# Patient Record
(demographics unavailable — no encounter records)

---

## 2024-10-17 NOTE — HISTORY OF PRESENT ILLNESS
[FreeTextEntry1] : RAJESH KOENIG is a 80 year old woman with GCA clinically with elevated ESR but negative bx. Recurrence of scalp tenderness, overall skin hyperesthesia over head/neck, mild temporal HA, mild jaw claudication, sweating with taper to 30mg, did well with 40 for a time, then requiring up titration to 60mg at which point she again achieved complete resolution of GCA symptoms. Started on Actemra SQ as steroid-sparing agent 2/2 requirements for high dose steroids and multiple comorbidities. Tolerating well, no issues with injection, no SE. Since last telephone conversation decreased steroids to 40mg daily without issues. Improvement in steroid induced muscle weakness but remains with thigh weakness. Is exercising regularly to improve this. Reports HA intermittently, clicking in jaw but no jaw claudication.   Denies new/worsening HA, visual changes, scalp tenderness, jaw claudication, F/C, night sweats, unintentional weight loss, limb weakness or paresthesias at present. No shoulder/hip girdle sx.  No F/C, recent infections. Continued mild SOB but no cough and can do all ADLs. Due to see cardiology next week as worsening LE edema as well.   GCA hx - Developed acute onset bilateral vision changes in December -- opaque bubbles in the vision + scalp sensitivity and itching, b/l shoulder and hip achy pain, low grade fever, some jaw pain with chewing so she was favoring softer foods-- self resolved within 30 minutes, ESR 84, started on prednisone 60 mg on 12/19/19, underwent bilateral TAB which were negative, tapered to prednisone 40 mg since 12/27. Then increased again. Actemra started Feb 2020.   + L hip OA with groin pain, stable  Labs: ESR 84 --> 50 -->36 --> 3, JORGE 1:320 homo (2017), CPK normal  B/l TAB 12/2019 -- negative  MRI/MRA head/neck normal  -------------  7/31/20 -- Since last visit, continued mild HA, continued scalp itchiness on prednisone 12.5mg so increased to 15mg. Today with mild, transient HA and milder scalp itchiness but no pain. Remains without visual changes or jaw claudication but at baseline has some TMJ which limits chewing. No SE with increased steroid dose, remains on Actemra qweekly.   9/25/20 -- Stable, continued mild HA and scalp itch since last visit, not worsening. Tapered x 1 week to prednisone 10mg without flare of sx. Denies new/worsening HA, visual changes, jaw claudication, F/C, night sweats, unintentional weight loss, limb weakness or paresthesias. No SE with Actemra. Feels well otherwise.   10/30/20 -- Intermittent HA but mild, b/l TMJ pain 2/2 grinding teeth. No recurrence of sx as tapering, denies severe consistent HA, visual changes, jaw claudication, temporal pain, F/C, PMR sx. Strength improving as steroids taper off. No infectious sx, feels well otherwise.   12/29/20 -- Ongoing mild HA, TMJ but no overt GCA sx, visual changes, F/C, PMR sx. Prednisone 5mg daily currently. Following with new ENT for throat lesion, felt to not be malignant so will be surveilled. Worsening depression but no SI/HI, following with psych.   4/1/21 -- GCA and PMR sx quiescent, has been trying not to grind teeth and this is helping with TMJ pain. Recent fall and has been unstable and now using cane constantly, no further falls, and has started PT to work on balance. Upcoming cardiac cath with plan for TAVR if needed thereafter. Tolerated taper of steroids well.   6/1/21 -- No GCA or PMR sx, prior TMJ also resolved. Marked improvement in SOB/NORTH, fatigue, and LE edema s/p TAVR, has healed up nicely, has resumed Actemra and tapered off steroids as of last week with mild arthralgias for a few days after only. Working with PT to improve strength and gait.   11/19/21 -- Presents acutely for b/l deQuervains tenosynovitis over b/l wrists, R >> L, in setting of recent PT work with repetitive wrist use. No other inflammatory arthritis, PMR and GCA ROS negative, and has been taking Actemra consistently. PT is helping her.   12/9/21 -- Ongoing but approx 60% improved b/l deQuervains s/p steroid taper, R is lagging behind, resolving bruise over L tho pt does not recall any trauma. No GCA or PMR sx, no other joint complaints, has not resumed PT yet as she is worried she will aggravate it. Also reports she normally gets myalgias from the >65 flu dose but had none with the regular one last visit.   2/17/22 -- Worsening diffuse muscle cramping in hands and feet, legs and occasionally back. GCA/PMR ROS negative, no issues with Actemra, no infectious sx. Prior Dequervains has entirely resolved, never needed local injections, off steroids. Worsening toe Raynaud's over last few months, no ulcerations.   5/11/22 -- B/l pinky triggers since last visit, not limiting her. GCA/PMR ROS negative, no issues with Actemra, no infectious sx. Raynauds improved with CCB, no SE. Mood improved since resuming antidepressants.   11/10/22 -- Ongoing pinky triggers not limiting her, GCA/PMR ROS negative, no issues with Actemra. + recent tooth issue c/b sinus infection, on Abx and will be getting extraction with ?plan for implant tomorrow. Raynaud's stable.   3/8/23 -- GCA/PMR ROS negative, no issues with Actemra, prior triggering has not recurred, no peripheral joint inflammation, Raynaud's stable. Feels well.   9/5/23 -- PMR/GCA ROS negative, no issues with Actemra, asking if can scale back on use, no peripheral joint sx, Raynauds stable.   12/12/23 -- Worsening symmetrical small joint arthralgias, no synovitis but limited ROM in hands, spasm ongoing in legs and ribs. PMR distribution not affected but some IT band TTP, GCA ROS negative but some night sweats. Raynauds stable.   4/18/24 -- Felt worse when tried to taper back Actemra, back on qweekly and no PMR/GCA sx. Pain over lateral R thigh, pt doing all her injections in this area. Raynauds stable. Hasn't done DEXA, no falls but needs cane at all times, trying to stay active. Seeing neurology for ongoing rib spasms as conservative measures haven't helped and doesn't want to try muscle relaxers.   10/17/24 -- Doing well, less arthralgias since losing some weight, ADLs intact.

## 2024-10-17 NOTE — ASSESSMENT
[FreeTextEntry1] : RAJESH KOENIG is a 83 year old woman with GCA dx clinically - abrupt visual disturbance, ESR 84, scalp TTP, low grade fevers, jaw claudication -- improved with high dose steroids, recurred on taper, resolved again on prednisone 60mg daily. TAB negative but given above symptoms, she has a moderate degree of suspicion for GCA irrespective of the biopsy. Given ongoing need for elevated doses of prednisone in setting of + DM2 and HTN, and concern for long term steroid SE, has been started on Actemra as steroid sparing immunomodulator. Doing well at present, ESR normalized and without overt GCA or PMR sx.  # GCA/PMR - currently quiescent - c/w Actemra q. weekly. Can revisit q2 weeks dosing if patient would like in the future  - try to alternate thighs for injections - might help R localized thigh pain  - no current role for steroids  - Dec labs stable, repeat with PMD labs in June including immunosuppression labs: Hepatitis panel, Quantiferon  - GCA symptoms reviewed with patient, advised to call office if develops any, go to ER if any visual changes  # Chronic diffuse arthralgias --appears moreso MSK, spasms of unclear etiology but in odd locations such as trunk  - CPK/aldolase normal, hasn't responded to conservative measures - supplemental Mg, dietary K, adequate PO hydration -- and defers muscle relaxers today - agree with neuro eval  - c/w daily light stretching   # Immunosuppressed status - pt counseled to call if febrile or unwell, counseled to hold medication while on Abx  # OP screening - has not had DEXA in >4 years - DEXA reordered today  - c/w optimized Ca/Vit D to maintain bone health 2/2 prior steroid use - c/w Ca 600mg BID with food, Vit D 1000 IU daily - Weightbearing exercises as tolerated for goal of 30 minutes 3x/week  # Raynauds - c/w non-pharmacological measures for Raynaud's - gloves, pocket warmers, limiting cold exposure - c/w low dose CCB  RTC in 4 months, sooner if new/worsening sx.

## 2024-10-17 NOTE — REVIEW OF SYSTEMS
[Arthralgias] : arthralgias [As Noted in HPI] : as noted in HPI [Difficulty Walking] : difficulty walking [Negative] : Heme/Lymph [Joint Swelling] : no joint swelling [Joint Stiffness] : no joint stiffness [de-identified] : dry skin, Raynauds  [de-identified] : Spasm

## 2024-10-17 NOTE — PHYSICAL EXAM
[General Appearance - Alert] : alert [General Appearance - In No Acute Distress] : in no acute distress [Sclera] : the sclera and conjunctiva were normal [PERRL With Normal Accommodation] : pupils were equal in size, round, and reactive to light [Extraocular Movements] : extraocular movements were intact [Oropharynx] : the oropharynx was normal [Neck Appearance] : the appearance of the neck was normal [Auscultation Breath Sounds / Voice Sounds] : lungs were clear to auscultation bilaterally [Heart Rate And Rhythm] : heart rate was normal and rhythm regular [Heart Sounds] : normal S1 and S2 [No CVA Tenderness] : no ~M costovertebral angle tenderness [No Spinal Tenderness] : no spinal tenderness [Nail Clubbing] : no clubbing  or cyanosis of the fingernails [Musculoskeletal - Swelling] : no joint swelling seen [Motor Tone] : muscle strength and tone were normal [] : no rash [No Focal Deficits] : no focal deficits [Impaired Insight] : insight and judgment were intact [Oriented To Time, Place, And Person] : oriented to person, place, and time [Affect] : the affect was normal [FreeTextEntry1] : dry skin over hands and feet and scalp, no active raynauds

## 2024-11-18 NOTE — REVIEW OF SYSTEMS
[Incontinence] : incontinence [Joint Pain] : joint pain [Joint Stiffness] : joint stiffness [Muscle Weakness] : muscle weakness [Muscle Pain] : muscle pain [Negative] : Heme/Lymph [Dysuria] : no dysuria [Nocturia] : no nocturia [Hematuria] : no hematuria [Frequency] : no frequency [Vaginal Discharge] : no vaginal discharge

## 2024-11-18 NOTE — ASSESSMENT
[FreeTextEntry1] :  Approximately 45 minutes was involved in this patient's care, including but not limited to preparing to see the patient, reviewing and obtaining the past and interval history, including medications, reviewing relevant testing, documenting clinical information, ordering of appropriate medications and testing, and coordinating medical care, including communicating with professionals involved in the care of the patient. flu vaccine provided today new follow up labs ordered.

## 2024-11-18 NOTE — PHYSICAL EXAM
[No Acute Distress] : no acute distress [Well Nourished] : well nourished [EOMI] : extraocular movements intact [Supple] : supple [Clear to Auscultation] : lungs were clear to auscultation bilaterally [Normal S1, S2] : normal S1 and S2 [No Edema] : there was no peripheral edema [Soft] : abdomen soft [Non Tender] : non-tender [No Rash] : no rash [Normal Gait] : normal gait [Normal Affect] : the affect was normal [de-identified] : right shoulder pain, pain with rom, lateral abduction, and posterior internal rotation

## 2024-11-18 NOTE — HISTORY OF PRESENT ILLNESS
[FreeTextEntry1] : follow up for recent UTI, recent ER visit, left inguinal hernia [de-identified] : Ms Blank Tavares is a 84 yo female presents today routine follow up. Pt was recently treated for UTI, states faired very poorly with ciprofloxacin, all joints pain. Pt now completed the antibiotics and seeks new labs checked, especially Urinalysis/UC. Pt also follows up with rheumatology Dr. Escudero, hx of Temporal arteritis. Other pertinent hx include urinary incontinence stress and urgency, type 2 diabetes, HTN, hypothyroidism. Pt also interested in flu vaccine today. Pt today denies any fever, chills, n/v/c/d. Pt also denies any LUTS.

## 2025-01-07 NOTE — HISTORY OF PRESENT ILLNESS
[FreeTextEntry1] : RAJESH KOENIG is a 80 year old woman with GCA clinically with elevated ESR but negative bx. Recurrence of scalp tenderness, overall skin hyperesthesia over head/neck, mild temporal HA, mild jaw claudication, sweating with taper to 30mg, did well with 40 for a time, then requiring up titration to 60mg at which point she again achieved complete resolution of GCA symptoms. Started on Actemra SQ as steroid-sparing agent 2/2 requirements for high dose steroids and multiple comorbidities. Tolerating well, no issues with injection, no SE. Since last telephone conversation decreased steroids to 40mg daily without issues. Improvement in steroid induced muscle weakness but remains with thigh weakness. Is exercising regularly to improve this. Reports HA intermittently, clicking in jaw but no jaw claudication.   Denies new/worsening HA, visual changes, scalp tenderness, jaw claudication, F/C, night sweats, unintentional weight loss, limb weakness or paresthesias at present. No shoulder/hip girdle sx.  No F/C, recent infections. Continued mild SOB but no cough and can do all ADLs. Due to see cardiology next week as worsening LE edema as well.   GCA hx - Developed acute onset bilateral vision changes in December -- opaque bubbles in the vision + scalp sensitivity and itching, b/l shoulder and hip achy pain, low grade fever, some jaw pain with chewing so she was favoring softer foods-- self resolved within 30 minutes, ESR 84, started on prednisone 60 mg on 12/19/19, underwent bilateral TAB which were negative, tapered to prednisone 40 mg since 12/27. Then increased again. Actemra started Feb 2020.   + L hip OA with groin pain, stable  Labs: ESR 84 --> 50 -->36 --> 3, JORGE 1:320 homo (2017), CPK normal  B/l TAB 12/2019 -- negative  MRI/MRA head/neck normal  -------------  7/31/20 -- Since last visit, continued mild HA, continued scalp itchiness on prednisone 12.5mg so increased to 15mg. Today with mild, transient HA and milder scalp itchiness but no pain. Remains without visual changes or jaw claudication but at baseline has some TMJ which limits chewing. No SE with increased steroid dose, remains on Actemra qweekly.   9/25/20 -- Stable, continued mild HA and scalp itch since last visit, not worsening. Tapered x 1 week to prednisone 10mg without flare of sx. Denies new/worsening HA, visual changes, jaw claudication, F/C, night sweats, unintentional weight loss, limb weakness or paresthesias. No SE with Actemra. Feels well otherwise.   10/30/20 -- Intermittent HA but mild, b/l TMJ pain 2/2 grinding teeth. No recurrence of sx as tapering, denies severe consistent HA, visual changes, jaw claudication, temporal pain, F/C, PMR sx. Strength improving as steroids taper off. No infectious sx, feels well otherwise.   12/29/20 -- Ongoing mild HA, TMJ but no overt GCA sx, visual changes, F/C, PMR sx. Prednisone 5mg daily currently. Following with new ENT for throat lesion, felt to not be malignant so will be surveilled. Worsening depression but no SI/HI, following with psych.   4/1/21 -- GCA and PMR sx quiescent, has been trying not to grind teeth and this is helping with TMJ pain. Recent fall and has been unstable and now using cane constantly, no further falls, and has started PT to work on balance. Upcoming cardiac cath with plan for TAVR if needed thereafter. Tolerated taper of steroids well.   6/1/21 -- No GCA or PMR sx, prior TMJ also resolved. Marked improvement in SOB/NORTH, fatigue, and LE edema s/p TAVR, has healed up nicely, has resumed Actemra and tapered off steroids as of last week with mild arthralgias for a few days after only. Working with PT to improve strength and gait.   11/19/21 -- Presents acutely for b/l deQuervains tenosynovitis over b/l wrists, R >> L, in setting of recent PT work with repetitive wrist use. No other inflammatory arthritis, PMR and GCA ROS negative, and has been taking Actemra consistently. PT is helping her.   12/9/21 -- Ongoing but approx 60% improved b/l deQuervains s/p steroid taper, R is lagging behind, resolving bruise over L tho pt does not recall any trauma. No GCA or PMR sx, no other joint complaints, has not resumed PT yet as she is worried she will aggravate it. Also reports she normally gets myalgias from the >65 flu dose but had none with the regular one last visit.   2/17/22 -- Worsening diffuse muscle cramping in hands and feet, legs and occasionally back. GCA/PMR ROS negative, no issues with Actemra, no infectious sx. Prior Dequervains has entirely resolved, never needed local injections, off steroids. Worsening toe Raynaud's over last few months, no ulcerations.   5/11/22 -- B/l pinky triggers since last visit, not limiting her. GCA/PMR ROS negative, no issues with Actemra, no infectious sx. Raynauds improved with CCB, no SE. Mood improved since resuming antidepressants.   11/10/22 -- Ongoing pinky triggers not limiting her, GCA/PMR ROS negative, no issues with Actemra. + recent tooth issue c/b sinus infection, on Abx and will be getting extraction with ?plan for implant tomorrow. Raynaud's stable.   3/8/23 -- GCA/PMR ROS negative, no issues with Actemra, prior triggering has not recurred, no peripheral joint inflammation, Raynaud's stable. Feels well.   9/5/23 -- PMR/GCA ROS negative, no issues with Actemra, asking if can scale back on use, no peripheral joint sx, Raynauds stable.   12/12/23 -- Worsening symmetrical small joint arthralgias, no synovitis but limited ROM in hands, spasm ongoing in legs and ribs. PMR distribution not affected but some IT band TTP, GCA ROS negative but some night sweats. Raynauds stable.   4/18/24 -- Felt worse when tried to taper back Actemra, back on qweekly and no PMR/GCA sx. Pain over lateral R thigh, pt doing all her injections in this area. Raynauds stable. Hasn't done DEXA, no falls but needs cane at all times, trying to stay active. Seeing neurology for ongoing rib spasms as conservative measures haven't helped and doesn't want to try muscle relaxers.   10/17/24 -- Doing well, less arthralgias since losing some weight, ADLs intact, no falls. GCA/PMR ROS negative, Raynaud's stable.   1/7/25: Right posterior hip pain has been going on for about 3 weeks. Traveling down from right hip to knee and groin. Still taking tylenol, helps a little. Achy type of pain. In the morning have a hard time moving around. With the weather, she felt her knees are sore. No recent trauma. Gets couple hours of sleep and sometimes she feels pain wakes her up.

## 2025-01-07 NOTE — PHYSICAL EXAM
[General Appearance - Alert] : alert [General Appearance - In No Acute Distress] : in no acute distress [Musculoskeletal - Swelling] : no joint swelling seen [FreeTextEntry1] : positive right sided straight leg test, good range of motion with right hip external rotation, some limitation with right hip internal rotaiton, full range of motion with right hip flexion and extension.  [] : no rash

## 2025-01-07 NOTE — ASSESSMENT
[FreeTextEntry1] : RAJESH KOENIG is a 83 year old woman with GCA dx clinically - abrupt visual disturbance, ESR 84, scalp TTP, low grade fevers, jaw claudication -- improved with high dose steroids, recurred on taper, resolved again on prednisone 60mg daily. TAB negative but given above symptoms, she has a moderate degree of suspicion for GCA irrespective of the biopsy. Given ongoing need for elevated doses of prednisone in setting of + DM2 and HTN, and concern for long term steroid SE, has been started on Actemra as steroid sparing immunomodulator. Doing well at present, ESR normalized and without overt GCA or PMR sx.  Presents today on 1/7 with right sided back/hip pain, exam consistent with sciatica. Suspect could be from DJD of lumbar spine and could have some OA of hips. I have asked her to get an x-ray of lumbosacral spine and hip/pelvis. We went over tylenol regimen as she was taking 3500mg daily, to decrease to 3000mg daily and at next visit with Dr. Escudero if symptoms persist can consider trialing low dose gabapentin (however pt says she would rather not try anything new at this time).   # GCA/PMR - currently quiescent - c/w Actemra q. weekly. Can revisit q2 weeks dosing if patient would like in the future  - no current role for steroids  - May labs stable, repeat with next PMD labs including immunosuppression labs: Hepatitis panel, Quantiferon  - GCA symptoms reviewed with patient, advised to call office if develops any, go to ER if any visual changes  # Chronic diffuse arthralgias --appears moreso MSK, spasms of unclear etiology but in odd locations such as trunk  - CPK/aldolase normal, hasn't responded to conservative measures - supplemental Mg, dietary K, adequate PO hydration -- has defered muscle relaxers -  c/w daily light stretching  # Immunosuppressed status - pt counseled to call if febrile or unwell, counseled to hold medication while on Abx - advised annual flu vaccine in Nov   # OP screening - has not had DEXA in >4 years - DEXA reordered today  - c/w optimized Ca/Vit D to maintain bone health 2/2 prior steroid use - c/w Ca 600mg BID with food, Vit D 1000 IU daily - Weightbearing exercises as tolerated for goal of 30 minutes 3x/week  # Raynauds - c/w non-pharmacological measures for Raynaud's - gloves, pocket warmers, limiting cold exposure - c/w low dose CCB  RTC in February 2025 with Dr. Escudero

## 2025-01-07 NOTE — REVIEW OF SYSTEMS
[Arthralgias] : arthralgias [As Noted in HPI] : as noted in HPI [Difficulty Walking] : difficulty walking [Negative] : Heme/Lymph [Joint Swelling] : no joint swelling [Joint Stiffness] : no joint stiffness [de-identified] : dry skin, Raynauds  [de-identified] : Spasm

## 2025-01-17 NOTE — HISTORY OF PRESENT ILLNESS
[FreeTextEntry1] : 84 y/o F, Hx Giant Cell Arteritis, Polymyalgia Rheumatica, HTN, Hypothyroidism,  A fib s/p ablation.Obesity Referred or Rt hip and low back pain   Reports gradual onset Rt hip and b/l low back pain x 2 months, No preceeding trauma or illness Pain is dull, on right hip radiating to medial upper thigh and over TFT/ITB, worse with movement, forward spinal flexion and prolonged standing No bowel or blader dysfunction  No benefit with tylenol and no recent engagement in therapy She did get therapy in the past at [facility], ambulatory PT, with minimal benefit But had remained reasonably pain free up till this time  She has been commenced on increasing dose gabapentin by her Rheumatologist with some benefit Currently on 200mg qhs and will be increasing to stable dose of 300mg qhs next week No overt drowsiness   Social Non smoker, no alcohol use Living alone, independent with ADLs Using walker and WC Able to cook and provider her basic needs But recently has to take multiple rest breaks when performing her routine daily activities due to hip/back pain

## 2025-01-17 NOTE — HISTORY OF PRESENT ILLNESS
[FreeTextEntry1] : 82 y/o F, Hx Giant Cell Arteritis, Polymyalgia Rheumatica, HTN, Hypothyroidism,  A fib s/p ablation.Obesity Referred or Rt hip and low back pain   Reports gradual onset Rt hip and b/l low back pain x 2 months, No preceeding trauma or illness Pain is dull, on right hip radiating to medial upper thigh and over TFT/ITB, worse with movement, forward spinal flexion and prolonged standing No bowel or blader dysfunction  No benefit with tylenol and no recent engagement in therapy She did get therapy in the past at [facility], ambulatory PT, with minimal benefit But had remained reasonably pain free up till this time  She has been commenced on increasing dose gabapentin by her Rheumatologist with some benefit Currently on 200mg qhs and will be increasing to stable dose of 300mg qhs next week No overt drowsiness   Social Non smoker, no alcohol use Living alone, independent with ADLs Using walker and WC Able to cook and provider her basic needs But recently has to take multiple rest breaks when performing her routine daily activities due to hip/back pain

## 2025-01-17 NOTE — DATA REVIEWED
[FreeTextEntry1] : 4/30/24--MRI thoracic spine--arthritis  1/13/25--DEXA scan--osteopenia 1/13/25--Multilevel osteoarthritis lumbar spine with hip OA R> left   Cr 1.24

## 2025-01-17 NOTE — REVIEW OF SYSTEMS
[Joint Stiffness] : joint stiffness [Muscle Pain] : muscle pain [Muscle Weakness] : no muscle weakness [Difficulty Walking] : difficulty walking

## 2025-01-17 NOTE — ASSESSMENT
[FreeTextEntry1] : 84 y/o F seen after referral for Rt hip and low back pain  Exam indicative of hips muscle tightness, limited ROM Imaging showering hip OA and multilevel lumbar disc disease  Recent labs--not acute changes,  Referred to physical therapy Ordered lidocaine patch and voltaren gell Continue tylenol extra 2gm bid and additional 500mg od prn Avoid NSAIDs, adverse effects discussed, considering her already existing CKD F/u 8 wks

## 2025-01-17 NOTE — PHYSICAL EXAM
[FreeTextEntry1] : Alert and fully oriented  Tone normal  Hip--Pain with Rt hip extension and limited extension  Pain with terminal flexion  Left knee --limited ROM TTP over b/l ITB  Requiring min assist sit to stand Took few steps without device, requiring rest after < 5ft due to fatigue and back hip pain

## 2025-01-31 NOTE — HISTORY OF PRESENT ILLNESS
[de-identified] : 83 year old female presents for tonsil cyst Has known about it for 3yr Previously seen Dr. Lai and Alessandro who recommended against intervention Denies dysphagia, no voice changes No other related sx No related pain

## 2025-01-31 NOTE — HISTORY OF PRESENT ILLNESS
[de-identified] : 83 year old female presents for tonsil cyst Has known about it for 3yr Previously seen Dr. Lai and Alessandro who recommended against intervention Denies dysphagia, no voice changes No other related sx No related pain

## 2025-01-31 NOTE — PHYSICAL EXAM
[Nasal Endoscopy Performed] : nasal endoscopy was performed, see procedure section for findings [de-identified] : R tonsillar cyst [Normal] : no rashes

## 2025-01-31 NOTE — PHYSICAL EXAM
[Nasal Endoscopy Performed] : nasal endoscopy was performed, see procedure section for findings [de-identified] : R tonsillar cyst [Normal] : no rashes

## 2025-03-10 NOTE — DISCUSSION/SUMMARY
[Patient] : the patient [Risks] : risks [Benefits] : benefits [Alternatives] : alternatives [___ Month(s)] : in [unfilled] month(s) [FreeTextEntry1] : Discussed with patient.  Continue losartan metoprolol aspirin hydrochlorothiazide and statin from cardiac viewpoint..  SBE prophylaxis discussed.  Follow-up echo regarding TAVR 6 months.  Hydrochlorothiazide to 12.5 mg daily.  Questions addressed with her. Labs with PMD [EKG obtained to assist in diagnosis and management of assessed problem(s)] : EKG obtained to assist in diagnosis and management of assessed problem(s)

## 2025-03-10 NOTE — CARDIOLOGY SUMMARY
[de-identified] : 11/7/22 sinus nstwc March 6, 2023 sinus rhythm nonspecific ST-T change September 11, 2023 sinus rhythm July 18, 2024 sinus rhythm low voltage March 11, 2024 sinus rhythm IR BBB 9/9/24 sinus March 10, 2025 sinus rhythm  [de-identified] : July 15, 2021 hyperdynamic left ventricle mild diastolic dysfunction TAVR no regurgitation November 2022 bioprosthetic valve normal LV systolic function November 2023 normal LV function TAVR 9/9/24 normal lv TAVR  [de-identified] : Ablation 2012 [de-identified] : 1998, 2021 [de-identified] : TAVR 2021 bioprosthesis

## 2025-03-10 NOTE — REASON FOR VISIT
[Arrhythmia/ECG Abnorrmalities] : arrhythmia/ECG abnormalities [Structural Heart and Valve Disease] : structural heart and valve disease [FreeTextEntry3] : Geri

## 2025-03-10 NOTE — ASSESSMENT
[FreeTextEntry1] : Status post TAVR.  Doing well.    No known recurrence of SVT .  Low normal blood pressure.  Some fatigue could be related

## 2025-03-10 NOTE — PHYSICAL EXAM
[Well Developed] : well developed [Well Nourished] : well nourished [No Acute Distress] : no acute distress [Normal Conjunctiva] : normal conjunctiva [Normal Venous Pressure] : normal venous pressure [No Carotid Bruit] : no carotid bruit [Normal S1, S2] : normal S1, S2 [No Rub] : no rub [No Gallop] : no gallop [Clear Lung Fields] : clear lung fields [Good Air Entry] : good air entry [No Respiratory Distress] : no respiratory distress  [Normal Gait] : normal gait [No Edema] : no edema [No Cyanosis] : no cyanosis [No Clubbing] : no clubbing [No Rash] : no rash [Moves all extremities] : moves all extremities [No Focal Deficits] : no focal deficits [Normal Speech] : normal speech [Alert and Oriented] : alert and oriented [Normal memory] : normal memory [de-identified] : grade 3 systolic murmur

## 2025-03-10 NOTE — REVIEW OF SYSTEMS
[Feeling Fatigued] : not feeling fatigued [Dyspnea on exertion] : not dyspnea during exertion [Lower Ext Edema] : lower extremity edema [Negative] : Respiratory

## 2025-03-10 NOTE — HISTORY OF PRESENT ILLNESS
[FreeTextEntry1] : Seen for follow-up history of TAVR history of ablation for SVT.  Treated for giant cell arteritis  ( Dr. Escudero).  Overall doing well from cardiac viewpoint no palpitations chest pain shortness of breath.   Feels very well. No edema now.  Some fatigue

## 2025-03-18 NOTE — ASSESSMENT
[FreeTextEntry1] : RAJESH KOENIG is a 83 year old woman with GCA dx clinically - abrupt visual disturbance, ESR 84, scalp TTP, low grade fevers, jaw claudication -- improved with high dose steroids, recurred on taper, resolved again on prednisone 60mg daily. TAB negative but given above symptoms, she has a moderate degree of suspicion for GCA irrespective of the biopsy. Given ongoing need for elevated doses of prednisone in setting of + DM2 and HTN, and concern for long term steroid SE, has been started on Actemra as steroid sparing immunomodulator. Doing well at present, ESR normalized and without overt GCA or PMR sx.   # GCA/PMR - currently quiescent  - c/w Actemra q. weekly. Can revisit q2 weeks dosing if patient would like in the future  - no current role for steroids  - May labs stable, repeat with next PMD labs including immunosuppression labs: Hepatitis panel, Quantiferon  - GCA symptoms reviewed with patient, advised to call office if develops any, go to ER if any visual changes  # Chronic diffuse arthralgias --appears moreso MSK, spasms of unclear etiology but in odd locations such as trunk  - CPK/aldolase normal, hasn't responded to conservative measures - supplemental Mg, dietary K, adequate PO hydration -- has defered muscle relaxers -  c/w daily light stretching - c/w PMR f/u and PT  - agree with Ortho knee eval for L knee pain  - compression stockings, leg elevation, decreased salt intake for LE edema - if not improving, advised pt to call cardiology   # Immunosuppressed status - pt counseled to call if febrile or unwell, counseled to hold medication while on Abx - advised annual flu vaccine in Nov   # OP screening - has not had DEXA in >4 years - DEXA reordered today  - c/w optimized Ca/Vit D to maintain bone health 2/2 prior steroid use - c/w Ca 600mg BID with food, Vit D 1000 IU daily - Weightbearing exercises as tolerated for goal of 30 minutes 3x/week  # Raynauds - c/w non-pharmacological measures for Raynaud's - gloves, pocket warmers, limiting cold exposure - c/w low dose CCB  RTC in 4 months, sooner if new/worsening sx.

## 2025-03-18 NOTE — HISTORY OF PRESENT ILLNESS
[FreeTextEntry1] : RAJESH KOENIG is a 80 year old woman with GCA clinically with elevated ESR but negative bx. Recurrence of scalp tenderness, overall skin hyperesthesia over head/neck, mild temporal HA, mild jaw claudication, sweating with taper to 30mg, did well with 40 for a time, then requiring up titration to 60mg at which point she again achieved complete resolution of GCA symptoms. Started on Actemra SQ as steroid-sparing agent 2/2 requirements for high dose steroids and multiple comorbidities. Tolerating well, no issues with injection, no SE. Since last telephone conversation decreased steroids to 40mg daily without issues. Improvement in steroid induced muscle weakness but remains with thigh weakness. Is exercising regularly to improve this. Reports HA intermittently, clicking in jaw but no jaw claudication.   Denies new/worsening HA, visual changes, scalp tenderness, jaw claudication, F/C, night sweats, unintentional weight loss, limb weakness or paresthesias at present. No shoulder/hip girdle sx.  No F/C, recent infections. Continued mild SOB but no cough and can do all ADLs. Due to see cardiology next week as worsening LE edema as well.   GCA hx - Developed acute onset bilateral vision changes in December -- opaque bubbles in the vision + scalp sensitivity and itching, b/l shoulder and hip achy pain, low grade fever, some jaw pain with chewing so she was favoring softer foods-- self resolved within 30 minutes, ESR 84, started on prednisone 60 mg on 12/19/19, underwent bilateral TAB which were negative, tapered to prednisone 40 mg since 12/27. Then increased again. Actemra started Feb 2020.   + L hip OA with groin pain, stable  Labs: ESR 84 --> 50 -->36 --> 3, JORGE 1:320 homo (2017), CPK normal  B/l TAB 12/2019 -- negative  MRI/MRA head/neck normal  -------------  7/31/20 -- Since last visit, continued mild HA, continued scalp itchiness on prednisone 12.5mg so increased to 15mg. Today with mild, transient HA and milder scalp itchiness but no pain. Remains without visual changes or jaw claudication but at baseline has some TMJ which limits chewing. No SE with increased steroid dose, remains on Actemra qweekly.   9/25/20 -- Stable, continued mild HA and scalp itch since last visit, not worsening. Tapered x 1 week to prednisone 10mg without flare of sx. Denies new/worsening HA, visual changes, jaw claudication, F/C, night sweats, unintentional weight loss, limb weakness or paresthesias. No SE with Actemra. Feels well otherwise.   10/30/20 -- Intermittent HA but mild, b/l TMJ pain 2/2 grinding teeth. No recurrence of sx as tapering, denies severe consistent HA, visual changes, jaw claudication, temporal pain, F/C, PMR sx. Strength improving as steroids taper off. No infectious sx, feels well otherwise.   12/29/20 -- Ongoing mild HA, TMJ but no overt GCA sx, visual changes, F/C, PMR sx. Prednisone 5mg daily currently. Following with new ENT for throat lesion, felt to not be malignant so will be surveilled. Worsening depression but no SI/HI, following with psych.   4/1/21 -- GCA and PMR sx quiescent, has been trying not to grind teeth and this is helping with TMJ pain. Recent fall and has been unstable and now using cane constantly, no further falls, and has started PT to work on balance. Upcoming cardiac cath with plan for TAVR if needed thereafter. Tolerated taper of steroids well.   6/1/21 -- No GCA or PMR sx, prior TMJ also resolved. Marked improvement in SOB/NORTH, fatigue, and LE edema s/p TAVR, has healed up nicely, has resumed Actemra and tapered off steroids as of last week with mild arthralgias for a few days after only. Working with PT to improve strength and gait.   11/19/21 -- Presents acutely for b/l deQuervains tenosynovitis over b/l wrists, R >> L, in setting of recent PT work with repetitive wrist use. No other inflammatory arthritis, PMR and GCA ROS negative, and has been taking Actemra consistently. PT is helping her.   12/9/21 -- Ongoing but approx 60% improved b/l deQuervains s/p steroid taper, R is lagging behind, resolving bruise over L tho pt does not recall any trauma. No GCA or PMR sx, no other joint complaints, has not resumed PT yet as she is worried she will aggravate it. Also reports she normally gets myalgias from the >65 flu dose but had none with the regular one last visit.   2/17/22 -- Worsening diffuse muscle cramping in hands and feet, legs and occasionally back. GCA/PMR ROS negative, no issues with Actemra, no infectious sx. Prior Dequervains has entirely resolved, never needed local injections, off steroids. Worsening toe Raynaud's over last few months, no ulcerations.   5/11/22 -- B/l pinky triggers since last visit, not limiting her. GCA/PMR ROS negative, no issues with Actemra, no infectious sx. Raynauds improved with CCB, no SE. Mood improved since resuming antidepressants.   11/10/22 -- Ongoing pinky triggers not limiting her, GCA/PMR ROS negative, no issues with Actemra. + recent tooth issue c/b sinus infection, on Abx and will be getting extraction with ?plan for implant tomorrow. Raynaud's stable.   3/8/23 -- GCA/PMR ROS negative, no issues with Actemra, prior triggering has not recurred, no peripheral joint inflammation, Raynaud's stable. Feels well.   9/5/23 -- PMR/GCA ROS negative, no issues with Actemra, asking if can scale back on use, no peripheral joint sx, Raynauds stable.   12/12/23 -- Worsening symmetrical small joint arthralgias, no synovitis but limited ROM in hands, spasm ongoing in legs and ribs. PMR distribution not affected but some IT band TTP, GCA ROS negative but some night sweats. Raynauds stable.   4/18/24 -- Felt worse when tried to taper back Actemra, back on qweekly and no PMR/GCA sx. Pain over lateral R thigh, pt doing all her injections in this area. Raynauds stable. Hasn't done DEXA, no falls but needs cane at all times, trying to stay active. Seeing neurology for ongoing rib spasms as conservative measures haven't helped and doesn't want to try muscle relaxers.   10/17/24 -- Doing well, less arthralgias since losing some weight, ADLs intact, no falls. GCA/PMR ROS negative, Raynaud's stable.   3/18/25 -- Hip and low back pain 2/2 OA - PM&R helping. Noting more L sided knee pain x few weeks, worsening LE edema in last week 2/2 ?salt intake and decrease in HCTZ dose 2/2 low BP when saw cardiology yesterday.

## 2025-03-18 NOTE — PHYSICAL EXAM
[General Appearance - Alert] : alert [General Appearance - In No Acute Distress] : in no acute distress [Sclera] : the sclera and conjunctiva were normal [PERRL With Normal Accommodation] : pupils were equal in size, round, and reactive to light [Extraocular Movements] : extraocular movements were intact [Oropharynx] : the oropharynx was normal [Neck Appearance] : the appearance of the neck was normal [Respiration, Rhythm And Depth] : normal respiratory rhythm and effort [No CVA Tenderness] : no ~M costovertebral angle tenderness [No Spinal Tenderness] : no spinal tenderness [Nail Clubbing] : no clubbing  or cyanosis of the fingernails [Musculoskeletal - Swelling] : no joint swelling seen [Motor Tone] : muscle strength and tone were normal [] : no rash [No Focal Deficits] : no focal deficits [Oriented To Time, Place, And Person] : oriented to person, place, and time [Impaired Insight] : insight and judgment were intact [Affect] : the affect was normal [FreeTextEntry1] : dry skin over hands and feet and scalp, no active raynauds

## 2025-03-18 NOTE — REVIEW OF SYSTEMS
[Arthralgias] : arthralgias [As Noted in HPI] : as noted in HPI [Difficulty Walking] : difficulty walking [Negative] : Heme/Lymph [Joint Swelling] : no joint swelling [Joint Stiffness] : no joint stiffness [de-identified] : dry skin, Raynauds  [de-identified] : Spasm

## 2025-03-31 NOTE — PHYSICAL EXAM
[No Acute Distress] : no acute distress [Well Nourished] : well nourished [Well Developed] : well developed [Well-Appearing] : well-appearing [Normal Voice/Communication] : normal voice/communication [Normal Sclera/Conjunctiva] : normal sclera/conjunctiva [PERRL] : pupils equal round and reactive to light [EOMI] : extraocular movements intact [Normal Outer Ear/Nose] : the outer ears and nose were normal in appearance [Normal TMs] : both tympanic membranes were normal [No JVD] : no jugular venous distention [No Lymphadenopathy] : no lymphadenopathy [Supple] : supple [Thyroid Normal, No Nodules] : the thyroid was normal and there were no nodules present [No Respiratory Distress] : no respiratory distress  [No Accessory Muscle Use] : no accessory muscle use [Clear to Auscultation] : lungs were clear to auscultation bilaterally [Normal Rate] : normal rate  [Regular Rhythm] : with a regular rhythm [Normal S1, S2] : normal S1 and S2 [No Murmur] : no murmur heard [No Carotid Bruits] : no carotid bruits [No Abdominal Bruit] : a ~M bruit was not heard ~T in the abdomen [No Varicosities] : no varicosities [Pedal Pulses Present] : the pedal pulses are present [No Edema] : there was no peripheral edema [No Palpable Aorta] : no palpable aorta [No Extremity Clubbing/Cyanosis] : no extremity clubbing/cyanosis [Soft] : abdomen soft [Non Tender] : non-tender [Non-distended] : non-distended [No Masses] : no abdominal mass palpated [No HSM] : no HSM [Normal Bowel Sounds] : normal bowel sounds [Normal] : soft, non-tender, non-distended, no masses palpated, no HSM and normal bowel sounds [No CVA Tenderness] : no CVA  tenderness [No Spinal Tenderness] : no spinal tenderness [No Joint Swelling] : no joint swelling [Grossly Normal Strength/Tone] : grossly normal strength/tone [No Rash] : no rash [Coordination Grossly Intact] : coordination grossly intact [No Focal Deficits] : no focal deficits [Speech Grossly Normal] : speech grossly normal [Memory Grossly Normal] : memory grossly normal [Normal Affect] : the affect was normal [Alert and Oriented x3] : oriented to person, place, and time [Normal Mood] : the mood was normal [Normal Insight/Judgement] : insight and judgment were intact [Comprehensive Foot Exam Normal] : Right and left foot were examined and both feet are normal. No ulcers in either foot. Toes are normal and with full ROM.  Normal tactile sensation with monofilament testing throughout both feet [de-identified] : obesity [de-identified] : Trace pretibial edema [de-identified] : GYN [de-identified] : obese [FreeTextEntry1] : GI [de-identified] : GYN [de-identified] : TAMERA [de-identified] : Patient developed a pruritic dermatitis seen by dermatology presently on triamcinolone cream [de-identified] : unsteady gait

## 2025-03-31 NOTE — HEALTH RISK ASSESSMENT
[Never (0 pts)] : Never (0 points) [No] : In the past 12 months have you used drugs other than those required for medical reasons? No [No falls in past year] : Patient reported no falls in the past year [Little interest or pleasure doing things] : 1) Little interest or pleasure doing things [Feeling down, depressed, or hopeless] : 2) Feeling down, depressed, or hopeless [0] : 2) Feeling down, depressed, or hopeless: Not at all (0) [PHQ-2 Negative - No further assessment needed] : PHQ-2 Negative - No further assessment needed [Audit-CScore] : 0 [[id number]] : 0 [With Patient/Caregiver] : , with patient/caregiver [Reviewed no changes] : Reviewed, no changes [Designated Healthcare Proxy] : Designated healthcare proxy [Relationship: ___] : Relationship: [unfilled] [Aggressive treatment] : aggressive treatment [AdvancecareDate] : 03/25 [Former] : Former [20 or more] : 20 or more [> 15 Years] : > 15 Years

## 2025-03-31 NOTE — ASSESSMENT
[FreeTextEntry1] : Assessment and plan:  1.  Urinary frequency, incontinence urge and stress we will attempt oxybutynin low-dose 5 mg side effects were discussed with patient if she develops any she will notify me.  2.  Acid reflux patient doing well with pantoprazole 40 mg she takes 1 tab daily in a.m.  3.  Diabetes mellitus type 2 non-insulin-requiring continue glipizide 5 mg daily patient tolerating medications and she is also following a no concentrated sweets consistent carbohydrate diet.  4.  History of Raynaud's phenomenon continue amlodipine low-dose 2.5 mg daily.  5.  GCA/PMR presently stable without steroids.  6.  Hypertension excellent blood pressure control continue losartan 25 mg, metoprolol ER 25 mg and spironolactone 50 mg 1 tablet daily patient tolerating medications and blood pressure is under good control.  Today's blood pressure 126/80 quite acceptable especially since the patient is in severe pain.  7.  Hypothyroidism continue levothyroxine 125 mcg.  8.  History of CHF patient's physical exam today was unremarkable there was no acute signs of CHF and proBNP was within normal limits.  9.  Unsteady gait patient ambulates with cane she is at increased risk of falls but she is extremely cautious.  10.  Reviewed all medications with patient what ever needed to be refilled was refilled.  Sent to mail away pharmacy.  Patient doing well with present regimen of medications she will be following up with cardiology and rheumatology.  Reviewed most recent blood work.  Total time spent face-to-face and non-face-to-face time 40 minutes the majority of which was spent on counseling and coordination of care and at today's visit comprehensive blood work drawn in office by examiner.

## 2025-03-31 NOTE — COUNSELING
[Fall prevention counseling provided] : Fall prevention counseling provided [Adequate lighting] : Adequate lighting [No throw rugs] : No throw rugs [Use proper foot wear] : Use proper foot wear [Use recommended devices] : Use recommended devices [Behavioral health counseling provided] : Behavioral health counseling provided [Sleep ___ hours/day] : Sleep [unfilled] hours/day [Engage in a relaxing activity] : Engage in a relaxing activity [Plan in advance] : Plan in advance [AUDIT-C Screening administered and reviewed] : AUDIT-C Screening administered and reviewed [Potential consequences of obesity discussed] : Potential consequences of obesity discussed [Benefits of weight loss discussed] : Benefits of weight loss discussed [Structured Weight Management Program suggested:] : Structured weight management program suggested [Encouraged to maintain food diary] : Encouraged to maintain food diary [Encouraged to increase physical activity] : Encouraged to increase physical activity [Encouraged to use exercise tracking device] : Encouraged to use exercise tracking device [FreeTextEntry1] : paul [FreeTextEntry3] : Physical activity limited due to advanced osteoarthritis [None] : None [Good understanding] : Patient has a good understanding of lifestyle changes and steps needed to achieve self management goal

## 2025-03-31 NOTE — REVIEW OF SYSTEMS
[Shortness Of Breath] : no shortness of breath [Wheezing] : no wheezing [Cough] : no cough [Dyspnea on Exertion] : dyspnea on exertion [Dysuria] : dysuria [Incontinence] : incontinence [Nocturia] : nocturia [Hematuria] : no hematuria [Frequency] : frequency [Vaginal Discharge] : no vaginal discharge [Joint Pain] : joint pain [Joint Stiffness] : joint stiffness [Joint Swelling] : no joint swelling [Muscle Weakness] : muscle weakness [Muscle Pain] : muscle pain [Back Pain] : back pain [Headache] : no headache [Dizziness] : no dizziness [Fainting] : no fainting [Confusion] : no confusion [Memory Loss] : no memory loss [Unsteady Walking] : ataxia [Suicidal] : not suicidal [Insomnia] : no insomnia [Anxiety] : anxiety [Depression] : no depression [Negative] : Heme/Lymph

## 2025-03-31 NOTE — HISTORY OF PRESENT ILLNESS
[FreeTextEntry1] : Follow-up and disease management. [de-identified] : Patient is an 83-year-old female who presents today for follow-up and disease management patient states that she has been in her usual state of health with a chief complaint of urinary frequency and incontinence.  It occurs more at night but by the patient's description that some mixed urinary incontinence stress and urgency.  History is also significant for diabetes mellitus type 2 non-insulin-requiring DOA, hypertension, hypothyroidism.

## 2025-06-11 NOTE — REVIEW OF SYSTEMS
[Abdominal Pain] : abdominal pain [Diarrhea] : diarrhea [Vomiting] : vomiting [Negative] : Heme/Lymph

## 2025-06-11 NOTE — PLAN
[FreeTextEntry1] : Due to significant limitations and concern to delay treatment, we will treat empirically with Augmentin for presumed enteritis/colitis that was not noted on CT scan. Would be rather difficult to perform stool studies.  Overall patient describes symptoms as fairly moderate. PCP in agreement Dress importance of increasing fluids Will repeat CBC and CMP Follow-up with symptoms persist-will consider stool studies at that time and consider repeat imaging  Will discuss with patient's rheumatologist regarding holding patient's actmera this sunday     I spent an estimated total time of 40 minutes on this encounter on the date clinical services were rendered. This includes both face-to-face time and non-face-to-face time spent reviewing the patient's chart, prior lab results, relevant imaging/diagnostic studies, and documentation in the EHR. An estimated total of 5 minutes was spent answering the patient's and/or any family member's questions.

## 2025-06-11 NOTE — HISTORY OF PRESENT ILLNESS
[de-identified] : Continues to have abdominal bloating, nausea, bloating sensation of havig to run to the bathroom if she eats anything.  Has been suffering from diarrhea for about 9 days now. Took Imodium of one bout of diarrhea. NO BM today NO abdominal pain, but soreness with palpation.  Currently on pantoprazole, and generally.  Tolerating saltines  Reviewed hospital records and ED visit on 6 9.  CT of the abdomen was negative for acute diverticulitis.  Overall patient was diagnosed with gastroenteritis.  Patient does have a number of arthritic concerns and may be fairly difficult to collect stool specimen, and to travel back and forth to the lab.  Discussed case with patient's PCP Dr. Goode.  Dr. Goode observed visit   Patient did take her Actemra sunday

## 2025-06-19 NOTE — HISTORY OF PRESENT ILLNESS
[FreeTextEntry1] : RAJESH KOENIG is a 80 year old woman with GCA clinically with elevated ESR but negative bx. Recurrence of scalp tenderness, overall skin hyperesthesia over head/neck, mild temporal HA, mild jaw claudication, sweating with taper to 30mg, did well with 40 for a time, then requiring up titration to 60mg at which point she again achieved complete resolution of GCA symptoms. Started on Actemra SQ as steroid-sparing agent 2/2 requirements for high dose steroids and multiple comorbidities. Tolerating well, no issues with injection, no SE. Since last telephone conversation decreased steroids to 40mg daily without issues. Improvement in steroid induced muscle weakness but remains with thigh weakness. Is exercising regularly to improve this. Reports HA intermittently, clicking in jaw but no jaw claudication.   Denies new/worsening HA, visual changes, scalp tenderness, jaw claudication, F/C, night sweats, unintentional weight loss, limb weakness or paresthesias at present. No shoulder/hip girdle sx.  No F/C, recent infections. Continued mild SOB but no cough and can do all ADLs. Due to see cardiology next week as worsening LE edema as well.   GCA hx - Developed acute onset bilateral vision changes in December -- opaque bubbles in the vision + scalp sensitivity and itching, b/l shoulder and hip achy pain, low grade fever, some jaw pain with chewing so she was favoring softer foods-- self resolved within 30 minutes, ESR 84, started on prednisone 60 mg on 12/19/19, underwent bilateral TAB which were negative, tapered to prednisone 40 mg since 12/27. Then increased again. Actemra started Feb 2020.   + L hip OA with groin pain, stable  Labs: ESR 84 --> 50 -->36 --> 3, JORGE 1:320 homo (2017), CPK normal  B/l TAB 12/2019 -- negative  MRI/MRA head/neck normal  -------------  7/31/20 -- Since last visit, continued mild HA, continued scalp itchiness on prednisone 12.5mg so increased to 15mg. Today with mild, transient HA and milder scalp itchiness but no pain. Remains without visual changes or jaw claudication but at baseline has some TMJ which limits chewing. No SE with increased steroid dose, remains on Actemra qweekly.   9/25/20 -- Stable, continued mild HA and scalp itch since last visit, not worsening. Tapered x 1 week to prednisone 10mg without flare of sx. Denies new/worsening HA, visual changes, jaw claudication, F/C, night sweats, unintentional weight loss, limb weakness or paresthesias. No SE with Actemra. Feels well otherwise.   10/30/20 -- Intermittent HA but mild, b/l TMJ pain 2/2 grinding teeth. No recurrence of sx as tapering, denies severe consistent HA, visual changes, jaw claudication, temporal pain, F/C, PMR sx. Strength improving as steroids taper off. No infectious sx, feels well otherwise.   12/29/20 -- Ongoing mild HA, TMJ but no overt GCA sx, visual changes, F/C, PMR sx. Prednisone 5mg daily currently. Following with new ENT for throat lesion, felt to not be malignant so will be surveilled. Worsening depression but no SI/HI, following with psych.   4/1/21 -- GCA and PMR sx quiescent, has been trying not to grind teeth and this is helping with TMJ pain. Recent fall and has been unstable and now using cane constantly, no further falls, and has started PT to work on balance. Upcoming cardiac cath with plan for TAVR if needed thereafter. Tolerated taper of steroids well.   6/1/21 -- No GCA or PMR sx, prior TMJ also resolved. Marked improvement in SOB/NORTH, fatigue, and LE edema s/p TAVR, has healed up nicely, has resumed Actemra and tapered off steroids as of last week with mild arthralgias for a few days after only. Working with PT to improve strength and gait.   11/19/21 -- Presents acutely for b/l deQuervains tenosynovitis over b/l wrists, R >> L, in setting of recent PT work with repetitive wrist use. No other inflammatory arthritis, PMR and GCA ROS negative, and has been taking Actemra consistently. PT is helping her.   12/9/21 -- Ongoing but approx 60% improved b/l deQuervains s/p steroid taper, R is lagging behind, resolving bruise over L tho pt does not recall any trauma. No GCA or PMR sx, no other joint complaints, has not resumed PT yet as she is worried she will aggravate it. Also reports she normally gets myalgias from the >65 flu dose but had none with the regular one last visit.   2/17/22 -- Worsening diffuse muscle cramping in hands and feet, legs and occasionally back. GCA/PMR ROS negative, no issues with Actemra, no infectious sx. Prior Dequervains has entirely resolved, never needed local injections, off steroids. Worsening toe Raynaud's over last few months, no ulcerations.   5/11/22 -- B/l pinky triggers since last visit, not limiting her. GCA/PMR ROS negative, no issues with Actemra, no infectious sx. Raynauds improved with CCB, no SE. Mood improved since resuming antidepressants.   11/10/22 -- Ongoing pinky triggers not limiting her, GCA/PMR ROS negative, no issues with Actemra. + recent tooth issue c/b sinus infection, on Abx and will be getting extraction with ?plan for implant tomorrow. Raynaud's stable.   3/8/23 -- GCA/PMR ROS negative, no issues with Actemra, prior triggering has not recurred, no peripheral joint inflammation, Raynaud's stable. Feels well.   9/5/23 -- PMR/GCA ROS negative, no issues with Actemra, asking if can scale back on use, no peripheral joint sx, Raynauds stable.   12/12/23 -- Worsening symmetrical small joint arthralgias, no synovitis but limited ROM in hands, spasm ongoing in legs and ribs. PMR distribution not affected but some IT band TTP, GCA ROS negative but some night sweats. Raynauds stable.   4/18/24 -- Felt worse when tried to taper back Actemra, back on qweekly and no PMR/GCA sx. Pain over lateral R thigh, pt doing all her injections in this area. Raynauds stable. Hasn't done DEXA, no falls but needs cane at all times, trying to stay active. Seeing neurology for ongoing rib spasms as conservative measures haven't helped and doesn't want to try muscle relaxers.   10/17/24 -- Doing well, less arthralgias since losing some weight, ADLs intact, no falls. GCA/PMR ROS negative, Raynaud's stable.   3/18/25 -- Hip and low back pain 2/2 OA - PM&R helping. Noting more L sided knee pain x few weeks, worsening LE edema in last week 2/2 ?salt intake and decrease in HCTZ dose 2/2 low BP when saw cardiology yesterday. Raynaud's stable. No falls. GCA/PMR ROS negative.   6/19/25-- Had recent GI illness with significant diarrhea since early June. Seen by PCP, was started on Augmentin. Now doing much better, was hungry yesterday and able to tolerate PO without diarrhea. Stool formed, non-blood, denies abdominal pain. Skipped last Actemra dose while on abx. Denies PMR or GCA symptoms.

## 2025-06-19 NOTE — REVIEW OF SYSTEMS
[Lower Ext Edema] : lower extremity edema [Arthralgias] : arthralgias [Joint Pain] : joint pain [As Noted in HPI] : as noted in HPI [Difficulty Walking] : difficulty walking [Negative] : Heme/Lymph [Joint Swelling] : no joint swelling [Joint Stiffness] : no joint stiffness [de-identified] : dry skin, Raynauds  [de-identified] : Spasm

## 2025-06-19 NOTE — ASSESSMENT
[FreeTextEntry1] : RAJESH KOENIG is a 83 year old woman with GCA dx clinically - abrupt visual disturbance, ESR 84, scalp TTP, low grade fevers, jaw claudication -- improved with high dose steroids, recurred on taper, resolved again on prednisone 60mg daily. TAB negative but given above symptoms, she has a moderate degree of suspicion for GCA irrespective of the biopsy. Given ongoing need for elevated doses of prednisone in setting of + DM2 and HTN, and concern for long term steroid SE, has been started on Actemra as steroid sparing immunomodulator. Doing well at present, ESR normalized and without overt GCA or PMR sx.   # GCA/PMR - currently quiescent  - Pt without symptoms, will decrease actemra dosing to [id number]  - no current role for steroids  - Repeat labs ordered for next visit with PCP. UTD with hep and quant  - GCA symptoms reviewed with patient, advised to call office if develops any, go to ER if any visual changes  #Acute diarrhea 2/2 gastroenteritis -Now resolved. ?food poisoning -Completing course of Augmentin   # Chronic diffuse arthralgias especially in hips, low back, knees - OA on imaging and partially MSK mediated as well, spasms of unclear etiology but in odd locations such as trunk  #LE edema - CPK/aldolase normal, hasn't responded to conservative measures - supplemental Mg, dietary K, adequate PO hydration -- has deferred muscle relaxers -  c/w daily light stretching - c/w gabapentin started earlier this year as getting some improvement  - c/w PM&R, sports medicine, and PT  - compression stockings, leg elevation, decreased salt intake for LE edema - following with cardiology   # Immunosuppressed status - pt counseled to call if febrile or unwell, counseled to hold medication while on Abx  # Osteopenia on DEXA 2025  - no current role for med mgmt  - c/w Ca 600mg BID with food, Vit D 1000 IU daily - Weightbearing exercises as tolerated for goal of 30 minutes 3x/week as feasible   # Raynaud's - c/w non-pharmacological measures for Raynaud's - gloves, pocket warmers, limiting cold exposure - c/w low dose CCB for now but can hold if ongoing hypotension and cardiology asks to stop it   RTC in 4 months, sooner if new/worsening sx.  Seen with Rheumatology Fellow, Helene Murguia MD, PGY-5

## 2025-07-08 NOTE — ASSESSMENT
[FreeTextEntry1] : My impression is that of a female with longstanding diarrhea, likely functional given extensive prior workup and recent imaging.  She is post infectious process with exacerbation of DGBI  I have spent a great deal of time discussing the role of daily high-intensity exercise with the patient. We discussed behavior modification strategies to institute this habit.   I have discussed nutrition in great detail including consuming vegetable fibers on a daily basis and limiting simple carbohydrates 5 days per week.  We have also reviewed the benefits of soluble fiber supplementation, including (but not limited to), favorable effects on lipid profile, weight control and the salutary effects on colonic microbiota. We reviewed the effects of such daily habits on metabolism and the metabolic set point resulting in a healthy weight, decreased pain sensitivity (effecting the GI tract), bowel habits and other aspects of health.  Trial of Florastor in addition to ongoing use of probiotics.  Return to office if no better for further evaluation.

## 2025-07-08 NOTE — REVIEW OF SYSTEMS
[As Noted in HPI] : as noted in HPI [Joint Stiffness] : joint stiffness [Negative] : Heme/Lymph [Joint Swelling] : no joint swelling [FreeTextEntry9] : walks with cane

## 2025-07-08 NOTE — HISTORY OF PRESENT ILLNESS
[FreeTextEntry1] : Son had "food poisoning" last month and patients frequent loose stool, especially after eating, worsened. No BRBPR No HB on PPI with poor diet Weight down  CT in ER recently. Little exercise Poor diet  [de-identified] : 06/09/2025    INTERPRETATION: CLINICAL INFORMATION: abd pain diarrhea 1 wk. h/o appendectomy, hysterectomy  COMPARISON: CT of the abdomen/pelvis dated 10/25/2024.  CONTRAST/COMPLICATIONS: IV Contrast: Omnipaque 350 90 cc administered 10 cc discarded Oral Contrast: NONE.  PROCEDURE: CT of the Abdomen and Pelvis was performed. Sagittal and coronal reformats were performed.  FINDINGS: LOWER CHEST: Status post TAVR. Small/moderate hiatal hernia is again noted.  LIVER: Steatosis. BILE DUCTS: Normal caliber. GALLBLADDER: Within normal limits. SPLEEN: Within normal limits. PANCREAS: Fatty infiltration. ADRENALS: Within normal limits. KIDNEYS/URETERS: Symmetric bilateral renal enhancement. Subcentimeter hypodensity in the upper pole of the left kidney too small to characterize. No renal stones. No hydronephrosis.  BLADDER: Within normal limits. REPRODUCTIVE ORGANS: Hysterectomy.  BOWEL: No bowel obstruction. A 5 cm diverticulum is arising from the distal duodenum, as seen on prior study. Extensive colonic diverticulosis without evidence of acute diverticulitis. Appendix is surgically absent. PERITONEUM/RETROPERITONEUM: Mildly prominent mesenteric lymph nodes with subtle ankur mesentery sparing the perilymphatic fat suggesting mesenteric panniculitis, as seen on prior study. VESSELS: Atherosclerotic changes. LYMPH NODES: As above. ABDOMINAL WALL: Moderate fat-containing left-sided inguinal hernia again noted. BONES: Degenerative changes.  IMPRESSION: No acute findings. Other findings, as above.